# Patient Record
Sex: MALE | Race: BLACK OR AFRICAN AMERICAN | Employment: FULL TIME | URBAN - METROPOLITAN AREA
[De-identification: names, ages, dates, MRNs, and addresses within clinical notes are randomized per-mention and may not be internally consistent; named-entity substitution may affect disease eponyms.]

---

## 2022-04-18 ENCOUNTER — APPOINTMENT (OUTPATIENT)
Dept: CT IMAGING | Age: 32
End: 2022-04-18
Payer: COMMERCIAL

## 2022-04-18 ENCOUNTER — HOSPITAL ENCOUNTER (EMERGENCY)
Age: 32
Discharge: HOME | End: 2022-04-18
Payer: COMMERCIAL

## 2022-04-18 ENCOUNTER — APPOINTMENT (OUTPATIENT)
Dept: GENERAL RADIOLOGY | Age: 32
End: 2022-04-18
Payer: COMMERCIAL

## 2022-04-18 VITALS
BODY MASS INDEX: 43.95 KG/M2 | SYSTOLIC BLOOD PRESSURE: 196 MMHG | HEIGHT: 67 IN | OXYGEN SATURATION: 98 % | DIASTOLIC BLOOD PRESSURE: 90 MMHG | TEMPERATURE: 98 F | RESPIRATION RATE: 16 BRPM | WEIGHT: 280 LBS | HEART RATE: 85 BPM

## 2022-04-18 DIAGNOSIS — S22.41XA CLOSED FRACTURE OF MULTIPLE RIBS OF RIGHT SIDE, INITIAL ENCOUNTER: Primary | ICD-10-CM

## 2022-04-18 PROCEDURE — 71101 X-RAY EXAM UNILAT RIBS/CHEST: CPT

## 2022-04-18 PROCEDURE — 74176 CT ABD & PELVIS W/O CONTRAST: CPT

## 2022-04-18 PROCEDURE — 99283 EMERGENCY DEPT VISIT LOW MDM: CPT

## 2022-04-18 PROCEDURE — 6370000000 HC RX 637 (ALT 250 FOR IP): Performed by: PHYSICIAN ASSISTANT

## 2022-04-18 RX ORDER — IBUPROFEN 800 MG/1
800 TABLET ORAL ONCE
Status: COMPLETED | OUTPATIENT
Start: 2022-04-18 | End: 2022-04-18

## 2022-04-18 RX ORDER — IBUPROFEN 800 MG/1
800 TABLET ORAL EVERY 6 HOURS PRN
Qty: 40 TABLET | Refills: 0 | Status: SHIPPED | OUTPATIENT
Start: 2022-04-18 | End: 2022-04-28

## 2022-04-18 RX ORDER — HYDROCODONE BITARTRATE AND ACETAMINOPHEN 5; 325 MG/1; MG/1
1 TABLET ORAL EVERY 6 HOURS PRN
Qty: 12 TABLET | Refills: 0 | Status: SHIPPED | OUTPATIENT
Start: 2022-04-18 | End: 2022-04-21

## 2022-04-18 RX ADMIN — IBUPROFEN 800 MG: 800 TABLET, FILM COATED ORAL at 20:34

## 2022-04-18 ASSESSMENT — PAIN DESCRIPTION - ORIENTATION: ORIENTATION: RIGHT

## 2022-04-18 ASSESSMENT — PAIN DESCRIPTION - PAIN TYPE: TYPE: ACUTE PAIN

## 2022-04-18 ASSESSMENT — PAIN DESCRIPTION - LOCATION: LOCATION: RIB CAGE

## 2022-04-18 ASSESSMENT — PAIN DESCRIPTION - DESCRIPTORS: DESCRIPTORS: CONSTANT

## 2022-04-18 ASSESSMENT — PAIN SCALES - GENERAL
PAINLEVEL_OUTOF10: 8
PAINLEVEL_OUTOF10: 9

## 2022-04-18 NOTE — Clinical Note
Naima Araujo was seen and treated in our emergency department on 4/18/2022. He may return to work on 04/22/2022. If you have any questions or concerns, please don't hesitate to call.       Cierra Lundberg PA-C

## 2022-04-18 NOTE — Clinical Note
Zulam Garcia was seen and treated in our emergency department on 4/18/2022. He may return to work on 04/22/2022. If you have any questions or concerns, please don't hesitate to call.       Benjie Cartwright PA-C

## 2022-04-19 NOTE — ED NOTES
Pt instructed on use of Incentive spirometer. Verbalizes understanding. Instructed patient in importance of following up with PCP for high BP. Pt states he will call tomorrow for appointment.      Dougie Mcdaniels RN  04/18/22 [id number]

## 2022-04-19 NOTE — ED PROVIDER NOTES
[facility]  Department of Emergency Medicine   ED  Encounter Note  Admit Date/RoomTime: 2022  6:47 PM  ED Room:     NAME: José Miguel Wright  : 1990  MRN: [id number]     Chief Complaint:  Rib Injury (mechanical fall from standing position onto 4 inch pipe. Pain rt rib cage)    History of Present Illness        José Miguel Wright is a 27 y.o. old male who presents to the emergency department by private vehicle, for traumatic cramping and sharp right, anterior chest, right lateral chest pain which occured a few minute(s) prior to arrival. The complaint occurred as a result of a fall from stumbling while at work. He fell across a 4\" pipe on the chest wall. Patient has no prior history of pain/injury with regards to today's visit. Since onset the symptoms have been persistent. His symptoms are associated with nothing additional. His pain is aggraveated by position, breathing, touch and chest wall motion and relieved by nothing. He denies any head injury, loss of consciousness, neck pain or extremity injury or pains. Tetanus Status: up to date. ROS   Pertinent positives and negatives are stated within HPI, all other systems reviewed and are negative. Past Medical History:  has a past medical history of Asthma and Hernia, abdominal.    Surgical History:  has no past surgical history on file. Social History:  reports that he has never smoked. His smokeless tobacco use includes snuff. He reports previous alcohol use. Family History: family history is not on file. Allergies: Patient has no known allergies. Physical Exam   Oxygen Saturation Interpretation: Normal.        ED Triage Vitals [22 1844]   BP Temp Temp Source Pulse Resp SpO2 Height Weight   (!) 191/101 98 °F (36.7 °C) Temporal 80 14 99 % 5' 7\" (1.702 m) 280 lb (127 kg)         Physical Exam  Constitutional:  Alert, development consistent with age. HEENT:  NC/NT. Airway patent.   Neck: Normal ROM. Supple. Respiratory:  Clear to auscultation and breath sounds equal.  CV:  Regular rate and rhythm, normal heart sounds, without pathological murmurs, ectopy, gallops, or rubs. Chest:  Right anterior, right lateral chest, tender to palpation, which does reproduce pain. Crepitance: No.          Skin:  no wounds, erythema, or swelling. GI:  Abdomen Soft, nontender, good bowel sounds. No firm or pulsatile mass. Integument:  Normal turgor. Warm, dry, without visible rash, unless noted elsewhere. Extremities: No tenderness or edema noted. Neurological:  Oriented. Motor functions intact. Lab / Imaging Results   (All laboratory and radiology results have been personally reviewed by myself)  Labs:  No results found for this visit on 04/18/22. Imaging: All Radiology results interpreted by Radiologist unless otherwise noted. CT ABDOMEN PELVIS WO CONTRAST Additional Contrast? None   Final Result   1. Nondisplaced right anterior/anterolateral 7th and 8th rib fractures. Right lower lung atelectasis and perhaps trace right pleural effusion. No   pneumothorax seen. 2.  Hepatomegaly and hepatic steatosis. 3.  Mild stool burden in the colon. Remainder of the study is as above. XR RIBS RIGHT INCLUDE CHEST (MIN 3 VIEWS)   Final Result   1. No acute process in the chest.   2. No evidence of right-sided rib fracture. ED Course / Medical Decision Making     Medications   ibuprofen (ADVIL;MOTRIN) tablet 800 mg (800 mg Oral Given 4/18/22 2034)        Re-examination:  4/19/22       Time: pt had slight pain relief with motrin, but any movement worsens his pain, he is driving self, unable to receive stronger pain medications    Consult(s):   None    Procedure(s):   none    MDM: Imaging was obtained based on moderate suspicion for fracture / bony abnormality, or intraabdominal injury as per history/physical findings.   Chest x-ray showed no fractures but abdominal CT showed nondisplaced seventh and eighth rib fracture. Patient was given a spirometer for use at home and pain control for home. Advised patient on safe use of narcotic pain medication. Advised patient to take to 3 days off work and I provided a work slip. Hopefully at his work they will be able to provide him with something less strenuous while he heals. Follow-up with primary care as needed. Return to emergency if worsening. Plan of Care/Counseling:  Florie Dakins, PA-C reviewed today's visit with the patient in addition to providing specific details for the plan of care and counseling regarding the diagnosis and prognosis. Questions are answered at this time and are agreeable with the plan. Assessment     1. Closed fracture of multiple ribs of right side, initial encounter      Plan   Discharged home. Patient condition is stable    New Medications     Discharge Medication List as of 4/18/2022 10:07 PM      START taking these medications    Details   ibuprofen (ADVIL;MOTRIN) 800 MG tablet Take 1 tablet by mouth every 6 hours as needed for Pain, Disp-40 tablet, R-0Print      HYDROcodone-acetaminophen (NORCO) 5-325 MG per tablet Take 1 tablet by mouth every 6 hours as needed for Pain for up to 3 days. , Disp-12 tablet, R-0Print           Electronically signed by Florie Dakins, PA-C   DD: 4/19/22  **This report was transcribed using voice recognition software. Every effort was made to ensure accuracy; however, inadvertent computerized transcription errors may be present.   END OF ED PROVIDER NOTE       Florie Dakins, PA-C  04/19/22 0147